# Patient Record
Sex: MALE | Race: WHITE | Employment: FULL TIME | URBAN - METROPOLITAN AREA
[De-identification: names, ages, dates, MRNs, and addresses within clinical notes are randomized per-mention and may not be internally consistent; named-entity substitution may affect disease eponyms.]

---

## 2017-03-25 ENCOUNTER — HOSPITAL ENCOUNTER (OUTPATIENT)
Dept: CT IMAGING | Facility: HOSPITAL | Age: 57
Discharge: HOME | End: 2017-03-25
Attending: OTOLARYNGOLOGY
Payer: COMMERCIAL

## 2017-03-25 DIAGNOSIS — J32.0 CHRONIC MAXILLARY SINUSITIS: ICD-10-CM

## 2017-03-25 DIAGNOSIS — J34.3 NASAL TURBINATE HYPERTROPHY: ICD-10-CM

## 2017-03-25 DIAGNOSIS — J34.89 NASAL OBSTRUCTION: ICD-10-CM

## 2017-03-25 PROCEDURE — 70486 CT MAXILLOFACIAL W/O DYE: CPT

## 2017-12-30 ENCOUNTER — LAB ENCOUNTER (OUTPATIENT)
Dept: LAB | Age: 57
End: 2017-12-30
Attending: OTOLARYNGOLOGY
Payer: COMMERCIAL

## 2017-12-30 DIAGNOSIS — J30.1 SEASONAL ALLERGIC RHINITIS DUE TO POLLEN, UNSPECIFIED CHRONICITY: ICD-10-CM

## 2017-12-30 PROCEDURE — 36415 COLL VENOUS BLD VENIPUNCTURE: CPT

## 2017-12-30 PROCEDURE — 86003 ALLG SPEC IGE CRUDE XTRC EA: CPT

## 2018-01-02 LAB
ALLERGEN,  SHRIMP IGE: 1.07 KU/L
ALLERGEN, CLAM IGE: 0.94 KU/L
ALLERGEN, CODFISH: <0.1 KU/L
ALLERGEN, CORN IGE: <0.1 KU/L
ALLERGEN, EGG WHITE IGE: <0.1 KU/L
ALLERGEN, MILK (COW) IGE: <0.1 KU/L
ALLERGEN, PEANUT IGE: 0.13 KU/L
ALLERGEN, SCALLOP IGE: 0.9 KU/L
ALLERGEN, SOYBEAN IGE: 0.1 KU/L
ALLERGEN, WALNUT/BLACK WALNUT: <0.1 KU/L
ALLERGEN, WHEAT IGE: 0.14 KU/L
IMMUNOGLOBULIN E: 547 KU/L

## 2018-01-03 NOTE — PROGRESS NOTES
Please inform RAST is positive for shellfish allergy  Please find out if any history of ananphylaxis in the past  Please find out if any allergy symptoms with shellfish and does patient have an epi pen.   If he has had any allergy symptoms or anaphylaxis in

## 2018-01-09 NOTE — PROGRESS NOTES
Pt returned phone call, informed pt of results, pt denied any history of anaphylaxis in the past, pt denied any allergy symptoms with shellfish and stated he doesn't eat shellfish that often, pt does not have an epi pen.  Went over in great detail the signs

## 2018-01-09 NOTE — PROGRESS NOTES
Left message request pt call back to go over results and to see if pt has hx of anaphylaxis in the past, or any allergy symptoms with shellfish, does pt have epi pen?

## 2019-03-15 PROBLEM — D12.5 BENIGN NEOPLASM OF SIGMOID COLON: Status: ACTIVE | Noted: 2019-03-15

## 2019-03-15 PROBLEM — D12.3 BENIGN NEOPLASM OF TRANSVERSE COLON: Status: ACTIVE | Noted: 2019-03-15

## 2019-03-15 PROBLEM — Z12.11 SPECIAL SCREENING FOR MALIGNANT NEOPLASM OF COLON: Status: ACTIVE | Noted: 2019-03-15

## 2019-03-15 PROBLEM — Z80.0 FAMILY HISTORY OF MALIGNANT NEOPLASM OF DIGESTIVE ORGAN: Status: ACTIVE | Noted: 2019-03-15
